# Patient Record
Sex: FEMALE | Race: OTHER | NOT HISPANIC OR LATINO | URBAN - METROPOLITAN AREA
[De-identification: names, ages, dates, MRNs, and addresses within clinical notes are randomized per-mention and may not be internally consistent; named-entity substitution may affect disease eponyms.]

---

## 2017-03-24 ENCOUNTER — EMERGENCY (EMERGENCY)
Facility: HOSPITAL | Age: 52
LOS: 1 days | Discharge: HOME | End: 2017-03-24
Attending: EMERGENCY MEDICINE | Admitting: EMERGENCY MEDICINE
Payer: COMMERCIAL

## 2017-03-24 VITALS
SYSTOLIC BLOOD PRESSURE: 131 MMHG | DIASTOLIC BLOOD PRESSURE: 71 MMHG | HEART RATE: 97 BPM | OXYGEN SATURATION: 100 % | TEMPERATURE: 98 F | RESPIRATION RATE: 18 BRPM

## 2017-03-24 VITALS
WEIGHT: 139.99 LBS | HEIGHT: 63 IN | DIASTOLIC BLOOD PRESSURE: 92 MMHG | OXYGEN SATURATION: 100 % | SYSTOLIC BLOOD PRESSURE: 136 MMHG | TEMPERATURE: 99 F | HEART RATE: 108 BPM | RESPIRATION RATE: 18 BRPM

## 2017-03-24 DIAGNOSIS — F41.9 ANXIETY DISORDER, UNSPECIFIED: ICD-10-CM

## 2017-03-24 DIAGNOSIS — R00.2 PALPITATIONS: ICD-10-CM

## 2017-03-24 PROCEDURE — 93010 ELECTROCARDIOGRAM REPORT: CPT

## 2017-03-24 PROCEDURE — 93005 ELECTROCARDIOGRAM TRACING: CPT

## 2017-03-24 PROCEDURE — 99284 EMERGENCY DEPT VISIT MOD MDM: CPT | Mod: 25

## 2017-03-24 PROCEDURE — 99283 EMERGENCY DEPT VISIT LOW MDM: CPT | Mod: 25
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
## 2017-03-24 NOTE — ED ADULT NURSE NOTE - OBJECTIVE STATEMENT
52 yr old female presents to the ed stating she has a verbal argument with her boss at 430am. states her heart was "racing" at that time. denies any palpitations or chest pain at this time. states "I feel better now". no distress noted. stat ekg completed. waiting to be seen by md. will continue to monitor.

## 2019-02-02 ENCOUNTER — EMERGENCY (EMERGENCY)
Facility: HOSPITAL | Age: 54
LOS: 1 days | Discharge: LEFT BEFORE DISCHARGE | End: 2019-02-02
Attending: EMERGENCY MEDICINE

## 2019-02-02 VITALS
HEIGHT: 62 IN | DIASTOLIC BLOOD PRESSURE: 90 MMHG | RESPIRATION RATE: 16 BRPM | OXYGEN SATURATION: 99 % | WEIGHT: 149.91 LBS | SYSTOLIC BLOOD PRESSURE: 160 MMHG | HEART RATE: 100 BPM | TEMPERATURE: 98 F

## 2019-02-02 NOTE — ED ADULT TRIAGE NOTE - CHIEF COMPLAINT QUOTE
pt states " I feel uncomfortable and I can't sleep" during triage pt would have short crying fits, denies feeling depressed or anxious, denies CP or SOB
